# Patient Record
Sex: FEMALE | Race: OTHER | Employment: UNEMPLOYED | URBAN - METROPOLITAN AREA
[De-identification: names, ages, dates, MRNs, and addresses within clinical notes are randomized per-mention and may not be internally consistent; named-entity substitution may affect disease eponyms.]

---

## 2021-12-29 PROCEDURE — 88185 FLOWCYTOMETRY/TC ADD-ON: CPT | Performed by: RADIOLOGY

## 2021-12-29 PROCEDURE — 88313 SPECIAL STAINS GROUP 2: CPT | Performed by: RADIOLOGY

## 2021-12-29 PROCEDURE — 88342 IMHCHEM/IMCYTCHM 1ST ANTB: CPT | Performed by: RADIOLOGY

## 2021-12-29 PROCEDURE — 88305 TISSUE EXAM BY PATHOLOGIST: CPT | Performed by: RADIOLOGY

## 2021-12-29 PROCEDURE — 88341 IMHCHEM/IMCYTCHM EA ADD ANTB: CPT | Performed by: RADIOLOGY

## 2021-12-29 PROCEDURE — 85097 BONE MARROW INTERPRETATION: CPT | Performed by: RADIOLOGY

## 2021-12-29 PROCEDURE — 88184 FLOWCYTOMETRY/ TC 1 MARKER: CPT | Performed by: RADIOLOGY

## 2021-12-30 PROCEDURE — 88264 CHROMOSOME ANALYSIS 20-25: CPT | Performed by: RADIOLOGY

## 2021-12-30 PROCEDURE — 88291 CYTO/MOLECULAR REPORT: CPT | Performed by: RADIOLOGY

## 2021-12-30 PROCEDURE — 88237 TISSUE CULTURE BONE MARROW: CPT | Performed by: RADIOLOGY

## 2022-02-22 PROBLEM — C50.412 MALIGNANT NEOPLASM OF UPPER-OUTER QUADRANT OF LEFT BREAST IN FEMALE, ESTROGEN RECEPTOR POSITIVE (HCC): Status: ACTIVE | Noted: 2022-02-22

## 2022-02-22 PROBLEM — I26.93 SINGLE SUBSEGMENTAL PULMONARY EMBOLISM WITHOUT ACUTE COR PULMONALE (HCC): Status: ACTIVE | Noted: 2022-02-22

## 2022-02-22 PROBLEM — D64.9 ANEMIA, UNSPECIFIED TYPE: Status: ACTIVE | Noted: 2022-02-22

## 2022-02-22 PROBLEM — Z17.0 MALIGNANT NEOPLASM OF UPPER-OUTER QUADRANT OF LEFT BREAST IN FEMALE, ESTROGEN RECEPTOR POSITIVE (HCC): Status: ACTIVE | Noted: 2022-02-22

## 2022-02-22 PROBLEM — C79.51 SECONDARY MALIGNANT NEOPLASM OF BONE (HCC): Status: ACTIVE | Noted: 2019-10-24

## 2022-03-17 ENCOUNTER — OFFICE VISIT (OUTPATIENT)
Dept: RHEUMATOLOGY | Facility: CLINIC | Age: 52
End: 2022-03-17
Payer: MEDICAID

## 2022-03-17 VITALS
HEIGHT: 60 IN | DIASTOLIC BLOOD PRESSURE: 75 MMHG | HEART RATE: 83 BPM | OXYGEN SATURATION: 96 % | BODY MASS INDEX: 30.04 KG/M2 | SYSTOLIC BLOOD PRESSURE: 100 MMHG | WEIGHT: 153 LBS

## 2022-03-17 DIAGNOSIS — M17.11 PRIMARY OSTEOARTHRITIS OF RIGHT KNEE: ICD-10-CM

## 2022-03-17 DIAGNOSIS — R77.8 LOW SERUM COMPLEMENT C3: ICD-10-CM

## 2022-03-17 DIAGNOSIS — M17.12 PRIMARY OSTEOARTHRITIS OF LEFT KNEE: ICD-10-CM

## 2022-03-17 DIAGNOSIS — Z11.1 SCREENING FOR TUBERCULOSIS: ICD-10-CM

## 2022-03-17 DIAGNOSIS — C79.51 SECONDARY MALIGNANT NEOPLASM OF BONE (HCC): ICD-10-CM

## 2022-03-17 DIAGNOSIS — Z51.81 THERAPEUTIC DRUG MONITORING: ICD-10-CM

## 2022-03-17 DIAGNOSIS — I26.93 SINGLE SUBSEGMENTAL PULMONARY EMBOLISM WITHOUT ACUTE COR PULMONALE (HCC): ICD-10-CM

## 2022-03-17 DIAGNOSIS — M25.461 EFFUSION OF RIGHT KNEE: ICD-10-CM

## 2022-03-17 DIAGNOSIS — M25.462 EFFUSION OF LEFT KNEE: ICD-10-CM

## 2022-03-17 DIAGNOSIS — M06.09 RHEUMATOID ARTHRITIS OF MULTIPLE SITES WITH NEGATIVE RHEUMATOID FACTOR (HCC): Primary | ICD-10-CM

## 2022-03-17 DIAGNOSIS — Z11.59 NEED FOR HEPATITIS C SCREENING TEST: ICD-10-CM

## 2022-03-17 DIAGNOSIS — O03.9 MISCARRIAGE AT 8 TO 28 WEEKS GESTATION: ICD-10-CM

## 2022-03-17 DIAGNOSIS — Z11.59 NEED FOR HEPATITIS B SCREENING TEST: ICD-10-CM

## 2022-03-17 PROCEDURE — 20610 DRAIN/INJ JOINT/BURSA W/O US: CPT | Performed by: INTERNAL MEDICINE

## 2022-03-17 PROCEDURE — 3074F SYST BP LT 130 MM HG: CPT | Performed by: INTERNAL MEDICINE

## 2022-03-17 PROCEDURE — 3078F DIAST BP <80 MM HG: CPT | Performed by: INTERNAL MEDICINE

## 2022-03-17 PROCEDURE — 3008F BODY MASS INDEX DOCD: CPT | Performed by: INTERNAL MEDICINE

## 2022-03-17 PROCEDURE — 99205 OFFICE O/P NEW HI 60 MIN: CPT | Performed by: INTERNAL MEDICINE

## 2022-03-17 RX ORDER — PREDNISONE 1 MG/1
TABLET ORAL
Qty: 28 TABLET | Refills: 0 | Status: SHIPPED | OUTPATIENT
Start: 2022-03-17 | End: 2022-04-07

## 2022-03-17 RX ORDER — VALGANCICLOVIR 450 MG/1
450 TABLET, FILM COATED ORAL DAILY
COMMUNITY

## 2022-03-17 RX ORDER — HYDROXYCHLOROQUINE SULFATE 200 MG/1
TABLET, FILM COATED ORAL
Qty: 180 TABLET | Refills: 1 | Status: SHIPPED | OUTPATIENT
Start: 2022-03-17

## 2022-03-17 RX ORDER — TRIAMCINOLONE ACETONIDE 40 MG/ML
40 INJECTION, SUSPENSION INTRA-ARTICULAR; INTRAMUSCULAR ONCE
Status: COMPLETED | OUTPATIENT
Start: 2022-03-17 | End: 2022-03-17

## 2022-03-17 RX ADMIN — TRIAMCINOLONE ACETONIDE 40 MG: 40 INJECTION, SUSPENSION INTRA-ARTICULAR; INTRAMUSCULAR at 17:26:00

## 2022-03-17 RX ADMIN — TRIAMCINOLONE ACETONIDE 40 MG: 40 INJECTION, SUSPENSION INTRA-ARTICULAR; INTRAMUSCULAR at 17:27:00

## 2022-03-17 NOTE — PATIENT INSTRUCTIONS
Hydroxychloroquine (plaquenil) start: Take 1 tab daily for 2 weeks, and if tolerating, then increase to 1 tab twice daily. Start pred 10 mg x 1 week, then pred 5 mg daily x 2 weeks. Take in the morning. Post Injection Instructions    1. The injected area may feel more painful again after the anesthetic wears off approximately 2 to 3 hours after the injection. Also, the steroid may not begin to work for 3 to 5 days. 2.  Please avoid any strenuous activity or exercise using the area that was injected for a period of at least 3 to 5 days. 3.  Please apply ice to the area of injection for 10 to 15 minutes when you get home. If needed, ice can be used every 4 hours as needed for 1 to 2 days after the injection. 4.  Please monitor the injected site carefully for signs of an infection. Please report to the emergency room with development of redness, swelling, severe pain, fever, chills, and/or drainage of pus from the injection site. 5.  If you are diabetic, steroid injection will likely increase your blood sugar level. Please monitor your blood sugar more frequently to make appropriate adjustments in your medication and let your primary care physician and/or diabetes doctor know to help manage it.

## 2022-03-18 ENCOUNTER — LAB ENCOUNTER (OUTPATIENT)
Dept: LAB | Age: 52
End: 2022-03-18
Attending: INTERNAL MEDICINE
Payer: MEDICAID

## 2022-03-18 DIAGNOSIS — Z11.1 SCREENING FOR TUBERCULOSIS: ICD-10-CM

## 2022-03-18 DIAGNOSIS — O03.9 MISCARRIAGE AT 8 TO 28 WEEKS GESTATION: ICD-10-CM

## 2022-03-18 DIAGNOSIS — R77.8 LOW SERUM COMPLEMENT C3: Primary | ICD-10-CM

## 2022-03-18 DIAGNOSIS — I26.93 SINGLE SUBSEGMENTAL PULMONARY EMBOLISM WITHOUT ACUTE COR PULMONALE (HCC): ICD-10-CM

## 2022-03-18 NOTE — PROGRESS NOTES
Procedure Note: right knee injection. The risks, benefits, and alternatives of the procedure were explained, and verbal consent was obtained. Area over medial mid-patellar aspect of knee was prepped with chlorhexidine and numbed with ethyl choloride spray. 25-gauge needle was inserted, and 40 mg of kenalog and 2 cc of lidocaine was injected. Patient tolerated procedure well without any immediate complications. Procedure Note: left knee injection. The risks, benefits, and alternatives of the procedure were explained, and verbal consent was obtained. Area over medial mid-patellar aspect of knee was prepped with chlorhexidine and numbed with ethyl choloride spray. 25-gauge needle was inserted, and 40 mg of kenalog and 2 cc of lidocaine was injected. Patient tolerated procedure well without any immediate complications.

## 2022-03-19 ENCOUNTER — LAB ENCOUNTER (OUTPATIENT)
Dept: LAB | Age: 52
End: 2022-03-19
Attending: INTERNAL MEDICINE
Payer: MEDICAID

## 2022-03-19 DIAGNOSIS — C79.51 SECONDARY MALIGNANT NEOPLASM OF BONE (HCC): ICD-10-CM

## 2022-03-19 DIAGNOSIS — Z11.59 NEED FOR HEPATITIS B SCREENING TEST: ICD-10-CM

## 2022-03-19 DIAGNOSIS — R77.8 LOW SERUM COMPLEMENT C3: ICD-10-CM

## 2022-03-19 DIAGNOSIS — Z11.1 SCREENING FOR TUBERCULOSIS: ICD-10-CM

## 2022-03-19 DIAGNOSIS — M06.09 RHEUMATOID ARTHRITIS OF MULTIPLE SITES WITH NEGATIVE RHEUMATOID FACTOR (HCC): ICD-10-CM

## 2022-03-19 DIAGNOSIS — O03.9 MISCARRIAGE AT 8 TO 28 WEEKS GESTATION: ICD-10-CM

## 2022-03-19 DIAGNOSIS — R76.8 HEPATITIS B CORE ANTIBODY POSITIVE: ICD-10-CM

## 2022-03-19 DIAGNOSIS — Z51.81 THERAPEUTIC DRUG MONITORING: ICD-10-CM

## 2022-03-19 DIAGNOSIS — I26.93 SINGLE SUBSEGMENTAL PULMONARY EMBOLISM WITHOUT ACUTE COR PULMONALE (HCC): ICD-10-CM

## 2022-03-19 DIAGNOSIS — Z11.59 NEED FOR HEPATITIS C SCREENING TEST: ICD-10-CM

## 2022-03-22 ENCOUNTER — LAB ENCOUNTER (OUTPATIENT)
Dept: LAB | Facility: HOSPITAL | Age: 52
End: 2022-03-22
Attending: INTERNAL MEDICINE
Payer: MEDICAID

## 2022-03-22 ENCOUNTER — TELEPHONE (OUTPATIENT)
Dept: RHEUMATOLOGY | Facility: CLINIC | Age: 52
End: 2022-03-22

## 2022-03-22 ENCOUNTER — HOSPITAL ENCOUNTER (EMERGENCY)
Facility: HOSPITAL | Age: 52
Discharge: LEFT BEFORE DISCHARGE | End: 2022-03-22
Payer: MEDICAID

## 2022-03-22 ENCOUNTER — LAB ENCOUNTER (OUTPATIENT)
Dept: LAB | Age: 52
End: 2022-03-22
Attending: INTERNAL MEDICINE
Payer: MEDICAID

## 2022-03-22 VITALS
DIASTOLIC BLOOD PRESSURE: 81 MMHG | HEART RATE: 75 BPM | TEMPERATURE: 99 F | OXYGEN SATURATION: 94 % | SYSTOLIC BLOOD PRESSURE: 126 MMHG | RESPIRATION RATE: 19 BRPM

## 2022-03-22 DIAGNOSIS — C50.412 MALIGNANT NEOPLASM OF UPPER-OUTER QUADRANT OF LEFT BREAST IN FEMALE, ESTROGEN RECEPTOR POSITIVE (HCC): ICD-10-CM

## 2022-03-22 DIAGNOSIS — C79.51 SECONDARY MALIGNANT NEOPLASM OF BONE (HCC): ICD-10-CM

## 2022-03-22 DIAGNOSIS — D64.9 ANEMIA, UNSPECIFIED TYPE: ICD-10-CM

## 2022-03-22 DIAGNOSIS — I26.93 SINGLE SUBSEGMENTAL PULMONARY EMBOLISM WITHOUT ACUTE COR PULMONALE (HCC): ICD-10-CM

## 2022-03-22 DIAGNOSIS — Z17.0 MALIGNANT NEOPLASM OF UPPER-OUTER QUADRANT OF LEFT BREAST IN FEMALE, ESTROGEN RECEPTOR POSITIVE (HCC): ICD-10-CM

## 2022-03-22 LAB
ALBUMIN SERPL-MCNC: 3.3 G/DL (ref 3.4–5)
ALBUMIN/GLOB SERPL: 0.8 {RATIO} (ref 1–2)
ALP LIVER SERPL-CCNC: 93 U/L
ALT SERPL-CCNC: 31 U/L
ANION GAP SERPL CALC-SCNC: 3 MMOL/L (ref 0–18)
AST SERPL-CCNC: 31 U/L (ref 15–37)
BASOPHILS # BLD AUTO: 0.02 X10(3) UL (ref 0–0.2)
BILIRUB SERPL-MCNC: 0.8 MG/DL (ref 0.1–2)
BUN BLD-MCNC: 14 MG/DL (ref 7–18)
C3 SERPL-MCNC: 96.2 MG/DL (ref 90–180)
C4 SERPL-MCNC: 14.1 MG/DL (ref 10–40)
CALCIUM BLD-MCNC: 8.6 MG/DL (ref 8.5–10.1)
CHLORIDE SERPL-SCNC: 109 MMOL/L (ref 98–112)
CO2 SERPL-SCNC: 27 MMOL/L (ref 21–32)
CREAT BLD-MCNC: 0.56 MG/DL
CRP SERPL-MCNC: <0.29 MG/DL (ref ?–0.3)
EOSINOPHIL # BLD AUTO: 0.21 X10(3) UL (ref 0–0.7)
EOSINOPHIL NFR BLD AUTO: 1.9 %
ERYTHROCYTE [DISTWIDTH] IN BLOOD BY AUTOMATED COUNT: 14.7 %
ERYTHROCYTE [SEDIMENTATION RATE] IN BLOOD: 37 MM/HR
FASTING STATUS PATIENT QL REPORTED: NO
GLOBULIN PLAS-MCNC: 4.4 G/DL (ref 2.8–4.4)
GLUCOSE BLD-MCNC: 89 MG/DL (ref 70–99)
HBV CORE AB SERPL QL IA: REACTIVE
HBV SURFACE AB SER QL: REACTIVE
HBV SURFACE AB SERPL IA-ACNC: 107.54 MIU/ML
HBV SURFACE AG SER-ACNC: <0.1 [IU]/L
HBV SURFACE AG SERPL QL IA: NONREACTIVE
HCT VFR BLD AUTO: 39.1 %
HCV AB SERPL QL IA: NONREACTIVE
HGB BLD-MCNC: 13 G/DL
IGA SERPL-MCNC: 610 MG/DL (ref 70–312)
IGM SERPL-MCNC: 119 MG/DL (ref 43–279)
IMM GRANULOCYTES # BLD AUTO: 0.03 X10(3) UL (ref 0–1)
IMM GRANULOCYTES NFR BLD: 0.3 %
IMMUNOGLOBULIN PNL SER-MCNC: 1780 MG/DL (ref 791–1643)
INR BLD: 1.16 (ref 0.8–1.2)
LYMPHOCYTES # BLD AUTO: 3.54 X10(3) UL (ref 1–4)
LYMPHOCYTES NFR BLD AUTO: 32.5 %
MCH RBC QN AUTO: 30.5 PG (ref 26–34)
MCHC RBC AUTO-ENTMCNC: 33.2 G/DL (ref 31–37)
MCV RBC AUTO: 91.8 FL
MONOCYTES # BLD AUTO: 1.03 X10(3) UL (ref 0.1–1)
MONOCYTES NFR BLD AUTO: 9.5 %
NEUTROPHILS # BLD AUTO: 6.06 X10 (3) UL (ref 1.5–7.7)
NEUTROPHILS # BLD AUTO: 6.06 X10(3) UL (ref 1.5–7.7)
NEUTROPHILS NFR BLD AUTO: 55.6 %
OSMOLALITY SERPL CALC.SUM OF ELEC: 288 MOSM/KG (ref 275–295)
PLATELET # BLD AUTO: 165 10(3)UL (ref 150–450)
POTASSIUM SERPL-SCNC: 3.6 MMOL/L (ref 3.5–5.1)
PROT SERPL-MCNC: 7.7 G/DL (ref 6.4–8.2)
PROTHROMBIN TIME: 14.8 SECONDS (ref 11.6–14.8)
RBC # BLD AUTO: 4.26 X10(6)UL
RHEUMATOID FACT SERPL-ACNC: 144 IU/ML (ref ?–15)
SODIUM SERPL-SCNC: 139 MMOL/L (ref 136–145)
WBC # BLD AUTO: 10.9 X10(3) UL (ref 4–11)

## 2022-03-22 PROCEDURE — 82784 ASSAY IGA/IGD/IGG/IGM EACH: CPT

## 2022-03-22 PROCEDURE — 82585 ASSAY OF CRYOFIBRINOGEN: CPT

## 2022-03-22 PROCEDURE — 82595 ASSAY OF CRYOGLOBULIN: CPT

## 2022-03-22 PROCEDURE — 86480 TB TEST CELL IMMUN MEASURE: CPT

## 2022-03-22 PROCEDURE — 86803 HEPATITIS C AB TEST: CPT

## 2022-03-22 PROCEDURE — 85652 RBC SED RATE AUTOMATED: CPT

## 2022-03-22 PROCEDURE — 86038 ANTINUCLEAR ANTIBODIES: CPT

## 2022-03-22 PROCEDURE — 86147 CARDIOLIPIN ANTIBODY EA IG: CPT

## 2022-03-22 PROCEDURE — 86160 COMPLEMENT ANTIGEN: CPT

## 2022-03-22 PROCEDURE — 80053 COMPREHEN METABOLIC PANEL: CPT

## 2022-03-22 PROCEDURE — 86334 IMMUNOFIX E-PHORESIS SERUM: CPT

## 2022-03-22 PROCEDURE — 83521 IG LIGHT CHAINS FREE EACH: CPT

## 2022-03-22 PROCEDURE — 86431 RHEUMATOID FACTOR QUANT: CPT

## 2022-03-22 PROCEDURE — 87517 HEPATITIS B DNA QUANT: CPT

## 2022-03-22 PROCEDURE — 85610 PROTHROMBIN TIME: CPT

## 2022-03-22 PROCEDURE — 86140 C-REACTIVE PROTEIN: CPT

## 2022-03-22 PROCEDURE — 84165 PROTEIN E-PHORESIS SERUM: CPT

## 2022-03-22 PROCEDURE — 36415 COLL VENOUS BLD VENIPUNCTURE: CPT

## 2022-03-22 PROCEDURE — 86704 HEP B CORE ANTIBODY TOTAL: CPT

## 2022-03-22 PROCEDURE — 86146 BETA-2 GLYCOPROTEIN ANTIBODY: CPT

## 2022-03-22 PROCEDURE — 85613 RUSSELL VIPER VENOM DILUTED: CPT

## 2022-03-22 PROCEDURE — 86036 ANCA SCREEN EACH ANTIBODY: CPT

## 2022-03-22 PROCEDURE — 85705 THROMBOPLASTIN INHIBITION: CPT

## 2022-03-22 PROCEDURE — 85025 COMPLETE CBC W/AUTO DIFF WBC: CPT

## 2022-03-22 PROCEDURE — 86706 HEP B SURFACE ANTIBODY: CPT

## 2022-03-22 PROCEDURE — 86200 CCP ANTIBODY: CPT

## 2022-03-22 PROCEDURE — 87340 HEPATITIS B SURFACE AG IA: CPT

## 2022-03-22 PROCEDURE — 85732 THROMBOPLASTIN TIME PARTIAL: CPT

## 2022-03-22 PROCEDURE — 83516 IMMUNOASSAY NONANTIBODY: CPT

## 2022-03-22 NOTE — ED QUICK NOTES
Family approached triage desk, stating they only need blood tests. PT and family requesting to leave ER and go to outpatient lab.

## 2022-03-22 NOTE — ED INITIAL ASSESSMENT (HPI)
A&Ox3 patient p/w multiple complaints    Patient stating her PMD sent her in for blood work, unable to recall what labs are needed. Patient also reports \"I need medication to stop my bleeding because I am going to a holy place to pray for my breast cancer\" states her doctor has given it to her before, but MD not available today. Denies any complaints today, no pain/cp/sob/n/v/d/c/f/LH/urinary sx at this time.  RR even/NL, speaking in full clear sentences

## 2022-03-23 LAB — ANA SER QL: NEGATIVE

## 2022-03-24 LAB
M TB IFN-G CD4+ T-CELLS BLD-ACNC: 0.02 IU/ML
M TB TUBERC IFN-G BLD QL: NEGATIVE
M TB TUBERC IGNF/MITOGEN IGNF CONTROL: >10 IU/ML
QFT TB1 AG MINUS NIL: 0.03 IU/ML
QFT TB2 AG MINUS NIL: 0.02 IU/ML

## 2022-03-25 LAB
ALBUMIN SERPL ELPH-MCNC: 3.57 G/DL (ref 3.75–5.21)
ALBUMIN/GLOB SERPL: 0.89 {RATIO} (ref 1–2)
ALPHA1 GLOB SERPL ELPH-MCNC: 0.28 G/DL (ref 0.19–0.46)
ALPHA2 GLOB SERPL ELPH-MCNC: 0.79 G/DL (ref 0.48–1.05)
ANTI-NUCLEAR ANTIBODY (ANA), HEP-2 IGG: DETECTED
APTT PPP: 26.4 SECONDS (ref 23.3–35.6)
B-GLOBULIN SERPL ELPH-MCNC: 1.14 G/DL (ref 0.68–1.23)
B2 GLYCOPROT1 IGG SERPL IA-ACNC: 1.6 U/ML (ref ?–7)
B2 GLYCOPROT1 IGM SERPL IA-ACNC: <2.9 U/ML (ref ?–7)
CARDIOLIPIN IGG SERPL-ACNC: 4.8 GPL (ref ?–10)
CARDIOLIPIN IGM SERPL-ACNC: 1 MPL (ref ?–10)
CCP IGG SERPL-ACNC: 277 U/ML (ref 0–6.9)
GAMMA GLOB SERPL ELPH-MCNC: 1.82 G/DL (ref 0.62–1.7)
HBV QNT BY NAAT (LOG IU/ML): <1 LOG IU/ML
HBV QNT BY NAAT INTERP: DETECTED
KAPPA LC FREE SER-MCNC: 1.81 MG/DL (ref 0.33–1.94)
KAPPA LC FREE/LAMBDA FREE SER NEPH: 1.25 {RATIO} (ref 0.26–1.65)
LA 3 SCREEN W REFLEX-IMP: NEGATIVE
MYELOPEROX ANTIBODIES, IGG: 1 AU/ML
PROT SERPL-MCNC: 7.6 G/DL (ref 6.4–8.2)
PROTHROMBIN TIME: 14.9 SECONDS (ref 11.6–14.8)
SCREEN DRVVT: 0.9 S (ref 0–1.29)
SCREEN DRVVT: NEGATIVE S
SERINE PROTEASE 3, IGG: 0 AU/ML
STACLOT LA DELTA: 0 SECONDS (ref ?–8)

## 2022-03-29 LAB
CRYOGLOBULIN QUANTITATIVE SCREEN: POSITIVE
IGG, CRYOPRECIPITATE: 3 MG/DL
IGM, CRYOPRECIPITATE: 2 MG/DL

## 2022-04-22 ENCOUNTER — TELEPHONE (OUTPATIENT)
Dept: RHEUMATOLOGY | Facility: CLINIC | Age: 52
End: 2022-04-22

## 2022-04-22 NOTE — TELEPHONE ENCOUNTER
Pt no showed to her appointment yesterday, can she come April 28th at 11:30 am? Gail Pickett to [facility] in that slot

## 2022-05-06 ENCOUNTER — TELEMEDICINE (OUTPATIENT)
Dept: RHEUMATOLOGY | Facility: CLINIC | Age: 52
End: 2022-05-06
Payer: MEDICAID

## 2022-05-06 DIAGNOSIS — R76.8 HEPATITIS B CORE ANTIBODY POSITIVE: ICD-10-CM

## 2022-05-06 DIAGNOSIS — M05.79 RHEUMATOID ARTHRITIS INVOLVING MULTIPLE SITES WITH POSITIVE RHEUMATOID FACTOR (HCC): Primary | ICD-10-CM

## 2022-05-06 DIAGNOSIS — C79.51 SECONDARY MALIGNANT NEOPLASM OF BONE (HCC): ICD-10-CM

## 2022-05-06 PROCEDURE — 99214 OFFICE O/P EST MOD 30 MIN: CPT | Performed by: INTERNAL MEDICINE

## 2022-05-06 RX ORDER — HYDROXYCHLOROQUINE SULFATE 200 MG/1
200 TABLET, FILM COATED ORAL 2 TIMES DAILY
Qty: 180 TABLET | Refills: 1 | Status: SHIPPED | OUTPATIENT
Start: 2022-05-06

## 2022-05-06 RX ORDER — PREDNISONE 1 MG/1
5 TABLET ORAL DAILY
Qty: 30 TABLET | Refills: 0 | Status: SHIPPED | OUTPATIENT
Start: 2022-05-06 | End: 2022-05-06

## 2022-05-06 RX ORDER — PREDNISONE 1 MG/1
5 TABLET ORAL DAILY
Qty: 30 TABLET | Refills: 0 | Status: SHIPPED | OUTPATIENT
Start: 2022-05-06

## 2022-05-06 RX ORDER — HYDROXYCHLOROQUINE SULFATE 200 MG/1
200 TABLET, FILM COATED ORAL 2 TIMES DAILY
Qty: 180 TABLET | Refills: 1 | Status: SHIPPED | OUTPATIENT
Start: 2022-05-06 | End: 2022-05-06

## 2022-06-02 ENCOUNTER — TELEPHONE (OUTPATIENT)
Dept: RHEUMATOLOGY | Facility: CLINIC | Age: 52
End: 2022-06-02

## 2022-06-03 NOTE — TELEPHONE ENCOUNTER
Called Dr. Maryan Ling office, they will send a page out to Dr. Bela Villarreal to contact Dr. Marcelle Jones to discuss pt.

## 2022-06-03 NOTE — TELEPHONE ENCOUNTER
Please call patient, can this patient come in next Wednesday at 8:30 or at 11 am next Thursday? Talked to Dr. Olivia Lowery, patient's oncologist.  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 Dr. Olivia Lowery is okay with either methotrexate or leflunomide.

## 2022-06-09 ENCOUNTER — OFFICE VISIT (OUTPATIENT)
Dept: RHEUMATOLOGY | Facility: CLINIC | Age: 52
End: 2022-06-09
Payer: MEDICAID

## 2022-06-09 VITALS
TEMPERATURE: 98 F | HEIGHT: 60 IN | DIASTOLIC BLOOD PRESSURE: 60 MMHG | HEART RATE: 92 BPM | SYSTOLIC BLOOD PRESSURE: 105 MMHG | OXYGEN SATURATION: 93 % | BODY MASS INDEX: 28.47 KG/M2 | WEIGHT: 145 LBS

## 2022-06-09 DIAGNOSIS — M05.79 RHEUMATOID ARTHRITIS INVOLVING MULTIPLE SITES WITH POSITIVE RHEUMATOID FACTOR (HCC): Primary | ICD-10-CM

## 2022-06-09 DIAGNOSIS — M17.12 PRIMARY OSTEOARTHRITIS OF LEFT KNEE: ICD-10-CM

## 2022-06-09 DIAGNOSIS — M25.462 EFFUSION OF LEFT KNEE: ICD-10-CM

## 2022-06-09 DIAGNOSIS — M17.0 BILATERAL PRIMARY OSTEOARTHRITIS OF KNEE: ICD-10-CM

## 2022-06-09 DIAGNOSIS — Z51.81 THERAPEUTIC DRUG MONITORING: ICD-10-CM

## 2022-06-09 PROBLEM — D70.9 NEUTROPENIA, UNSPECIFIED (HCC): Status: ACTIVE | Noted: 2022-06-09

## 2022-06-09 PROCEDURE — 3074F SYST BP LT 130 MM HG: CPT | Performed by: INTERNAL MEDICINE

## 2022-06-09 PROCEDURE — 3078F DIAST BP <80 MM HG: CPT | Performed by: INTERNAL MEDICINE

## 2022-06-09 PROCEDURE — 20610 DRAIN/INJ JOINT/BURSA W/O US: CPT | Performed by: INTERNAL MEDICINE

## 2022-06-09 PROCEDURE — 99214 OFFICE O/P EST MOD 30 MIN: CPT | Performed by: INTERNAL MEDICINE

## 2022-06-09 PROCEDURE — 3008F BODY MASS INDEX DOCD: CPT | Performed by: INTERNAL MEDICINE

## 2022-06-09 RX ORDER — TRIAMCINOLONE ACETONIDE 40 MG/ML
40 INJECTION, SUSPENSION INTRA-ARTICULAR; INTRAMUSCULAR ONCE
Status: COMPLETED | OUTPATIENT
Start: 2022-06-09 | End: 2022-06-09

## 2022-06-09 RX ORDER — FLUTICASONE PROPIONATE AND SALMETEROL XINAFOATE 230; 21 UG/1; UG/1
AEROSOL, METERED RESPIRATORY (INHALATION)
COMMUNITY
Start: 2022-06-08

## 2022-06-09 RX ORDER — LEG BRACE
EACH MISCELLANEOUS
Qty: 2 EACH | Refills: 0 | Status: SHIPPED | OUTPATIENT
Start: 2022-06-09

## 2022-06-09 RX ORDER — MORPHINE SULFATE 30 MG/1
TABLET, FILM COATED, EXTENDED RELEASE ORAL
COMMUNITY
Start: 2022-05-20

## 2022-06-09 RX ORDER — FOLIC ACID 1 MG/1
1 TABLET ORAL DAILY
Qty: 90 TABLET | Refills: 3 | Status: SHIPPED | OUTPATIENT
Start: 2022-06-09

## 2022-06-09 RX ORDER — LEG BRACE
EACH MISCELLANEOUS
Qty: 2 EACH | Refills: 0 | Status: SHIPPED | OUTPATIENT
Start: 2022-06-09 | End: 2022-06-09

## 2022-06-09 RX ORDER — PREDNISONE 1 MG/1
5 TABLET ORAL DAILY
Qty: 90 TABLET | Refills: 0 | Status: SHIPPED | OUTPATIENT
Start: 2022-06-09

## 2022-06-09 RX ORDER — METHOTREXATE 2.5 MG/1
TABLET ORAL
Qty: 50 TABLET | Refills: 0 | Status: SHIPPED | OUTPATIENT
Start: 2022-06-09 | End: 2022-08-12

## 2022-06-09 RX ADMIN — TRIAMCINOLONE ACETONIDE 40 MG: 40 INJECTION, SUSPENSION INTRA-ARTICULAR; INTRAMUSCULAR at 10:52:00

## 2022-06-09 NOTE — PROGRESS NOTES
Procedure Note: left knee injection. The risks, benefits, and alternatives of the procedure were explained, and verbal consent was obtained. Area over medial mid-patellar aspect of knee was prepped with chlorhexidine and numbed with ethyl choloride spray. 25-gauge needle was inserted, and 40 mg of kenalog and 2 cc of lidocaine was injected. Patient tolerated procedure well without any immediate complications.

## 2022-06-09 NOTE — PATIENT INSTRUCTIONS
Start hydroxychloroquine (plaquenil) 200 mg twice daily    Start methotrexate 10 mg (4 tablets) weekly for 2 weeks -> then increase to 15 mg (6 tablets) weekly for 2 weeks then get repeat bloodwork (this has been ordered already as \"standing labs\")    Take folic acid daily    ==============================================================================================================        Some tips and information when starting on methotrexate:     -Make sure to only take the pills once a week. Do not take the pills every day, which can increase risk of side effects.   -When you first start taking methotrexate, try taking it on a day you are not too busy, in the event you develop any side effects.   -Some find taking methotrexate at night helps them \"sleep off\" any nausea that sometimes can occur.   -If you find you have fatigue the day or two after taking methotrexate, try drinking an extra cup (or two) of coffee on those days. Methotrexate can briefly act as an \"anti-caffeine\"; this effect is usually only on the day that you take methotrexate.   -Make sure to take folic acid every day to prevent side effects. -If you have nausea with methotrexate, you can split up the weekly dose as long it is within a 24-30 hour window. So if you were taking 4 pills of methotrexate every Saturday evening, you could split the dose to 2 pills Saturday morning and 2 pills Saturday. Alternatively, you could also take 2 pills Saturday evening and 2 pills Sunday evening.     -Skip 2 weeks of methotrexate after the flu vaccine and 1 week of methotrexate after the COVID or pneumonia vaccine; this may make the vaccines work more effectively. -If you take an antibiotic for an infection, make sure to stop methotrexate until you are off the antibiotic.  -If you have an upcoming surgery, let me know. For most orthopedic surgeries such as knee/hip replacements, methotrexate may be continued.  For other surgeries, we will decide on a case-by-case basis.   -Don't drink more than 2-3 alcoholic beverages per week while on methotrexate.   -If planning on becoming pregnant, make sure to let me know as it can cause birth defects. Methotrexate needs to be stopped 3 months prior to attempting conception. Methotrexate does NOT impact future fertility.   -Low dose methotrexate is the most studied and commonly used rheumatoid arthritis medication. It is very safe if used appropriately. Most studies have not shown a significant increased risk of infection with the medication at low doses for arthritis. It is safe to take methotrexate during the COVID pandemic. It has NOT been shown that taking methotrexate increases risk of itz COVID or having a higher risk of severe complications from [place]. Therefore, from a practical standpoint, low dose methotrexate would not be considered an immunosuppressive as it does not increase risk  of infection for most people taking it. However, there may be a mild increase in infections if you have many other medical issues in combination such as heart disease, diabetes, etc.      Here is a nice article talking about common misconceptions and \"myths\" about methotrexate that is worth a read.    https://creakyjoints.org/treatment/methotrexate-myths/

## 2022-06-29 ENCOUNTER — OFFICE VISIT (OUTPATIENT)
Dept: RHEUMATOLOGY | Facility: CLINIC | Age: 52
End: 2022-06-29
Payer: MEDICAID

## 2022-06-29 VITALS
SYSTOLIC BLOOD PRESSURE: 110 MMHG | BODY MASS INDEX: 28.47 KG/M2 | WEIGHT: 145 LBS | OXYGEN SATURATION: 98 % | TEMPERATURE: 98 F | HEIGHT: 60 IN | DIASTOLIC BLOOD PRESSURE: 70 MMHG | HEART RATE: 112 BPM

## 2022-06-29 DIAGNOSIS — M20.001 ACQUIRED BILATERAL FINGER DEFORMITY: ICD-10-CM

## 2022-06-29 DIAGNOSIS — M20.002 ACQUIRED BILATERAL FINGER DEFORMITY: ICD-10-CM

## 2022-06-29 DIAGNOSIS — M05.79 RHEUMATOID ARTHRITIS INVOLVING MULTIPLE SITES WITH POSITIVE RHEUMATOID FACTOR (HCC): Primary | ICD-10-CM

## 2022-06-29 DIAGNOSIS — M25.421 EFFUSION OF RIGHT ELBOW: ICD-10-CM

## 2022-06-29 DIAGNOSIS — M19.049 OSTEOARTHRITIS OF HAND, UNSPECIFIED LATERALITY, UNSPECIFIED OSTEOARTHRITIS TYPE: ICD-10-CM

## 2022-06-29 DIAGNOSIS — Z51.81 THERAPEUTIC DRUG MONITORING: ICD-10-CM

## 2022-06-29 PROCEDURE — 99214 OFFICE O/P EST MOD 30 MIN: CPT | Performed by: INTERNAL MEDICINE

## 2022-06-29 PROCEDURE — 3008F BODY MASS INDEX DOCD: CPT | Performed by: INTERNAL MEDICINE

## 2022-06-29 PROCEDURE — 3074F SYST BP LT 130 MM HG: CPT | Performed by: INTERNAL MEDICINE

## 2022-06-29 PROCEDURE — 3078F DIAST BP <80 MM HG: CPT | Performed by: INTERNAL MEDICINE

## 2022-06-29 PROCEDURE — 20605 DRAIN/INJ JOINT/BURSA W/O US: CPT | Performed by: INTERNAL MEDICINE

## 2022-06-29 RX ORDER — METHOTREXATE 2.5 MG/1
15 TABLET ORAL WEEKLY
Qty: 78 TABLET | Refills: 0 | Status: SHIPPED | OUTPATIENT
Start: 2022-06-29 | End: 2022-09-28

## 2022-06-29 RX ORDER — PREDNISONE 1 MG/1
5 TABLET ORAL DAILY
Qty: 90 TABLET | Refills: 0 | Status: SHIPPED | OUTPATIENT
Start: 2022-06-29

## 2022-06-29 RX ORDER — TRIAMCINOLONE ACETONIDE 40 MG/ML
20 INJECTION, SUSPENSION INTRA-ARTICULAR; INTRAMUSCULAR ONCE
Status: COMPLETED | OUTPATIENT
Start: 2022-06-29 | End: 2022-06-29

## 2022-06-29 RX ORDER — POTASSIUM CHLORIDE 750 MG/1
TABLET, FILM COATED, EXTENDED RELEASE ORAL
COMMUNITY
Start: 2022-05-17

## 2022-06-29 RX ADMIN — TRIAMCINOLONE ACETONIDE 20 MG: 40 INJECTION, SUSPENSION INTRA-ARTICULAR; INTRAMUSCULAR at 13:14:00

## 2022-06-29 NOTE — PATIENT INSTRUCTIONS
Schedule hand therapy with occupational therapy. Walking or Running shoe: Ask  about a stability shoe that prevents pronation. One example is [employer] ([employer] store in [place]). For foot/ankle pain: get [employer]otics (from [employer] or sporting good stores). Get the green or blue versions     Continue methotrexate 6 tablets weekly, hydroxychloroquine (plaquenil) 200 mg twice daily. Repeat blood work (CMP and CBC) in 4 weeks.

## 2022-06-29 NOTE — PROGRESS NOTES
Procedure Note: right elbow injection. The risks, benefits, and alternatives of the procedure were explained, and verbal consent was obtained. Area over lateral aspect of elbow between olecranon, radial head, and lateral epicondyle was prepped with chlorhexidine and numbed with ethyl choloride spray. 22-gauge needle was inserted, and 20 mg of kenalog and 0.5 cc of lidocaine was injected. Patient tolerated procedure well without any immediate complications.

## 2022-07-07 ENCOUNTER — TELEPHONE (OUTPATIENT)
Dept: PHYSICAL THERAPY | Facility: HOSPITAL | Age: 52
End: 2022-07-07

## 2022-09-22 ENCOUNTER — TELEMEDICINE (OUTPATIENT)
Dept: RHEUMATOLOGY | Facility: CLINIC | Age: 52
End: 2022-09-22

## 2022-09-22 DIAGNOSIS — M05.79 RHEUMATOID ARTHRITIS INVOLVING MULTIPLE SITES WITH POSITIVE RHEUMATOID FACTOR (HCC): ICD-10-CM

## 2022-09-22 DIAGNOSIS — Z51.81 THERAPEUTIC DRUG MONITORING: ICD-10-CM

## 2022-09-22 DIAGNOSIS — Z79.52 LONG TERM CURRENT USE OF SYSTEMIC STEROIDS: Primary | ICD-10-CM

## 2022-09-22 PROCEDURE — 99214 OFFICE O/P EST MOD 30 MIN: CPT | Performed by: INTERNAL MEDICINE

## 2022-09-22 RX ORDER — ONDANSETRON HYDROCHLORIDE 8 MG/1
TABLET, FILM COATED ORAL
COMMUNITY
Start: 2022-09-01

## 2022-09-22 RX ORDER — METHOTREXATE 2.5 MG/1
20 TABLET ORAL WEEKLY
Qty: 104 TABLET | Refills: 0 | Status: SHIPPED | OUTPATIENT
Start: 2022-09-22 | End: 2022-12-22

## 2022-09-22 RX ORDER — PREDNISONE 1 MG/1
5 TABLET ORAL DAILY
Qty: 90 TABLET | Refills: 0 | Status: SHIPPED | OUTPATIENT
Start: 2022-09-22

## 2022-09-22 RX ORDER — FOLIC ACID 1 MG/1
1 TABLET ORAL DAILY
Qty: 90 TABLET | Refills: 3 | Status: SHIPPED | OUTPATIENT
Start: 2022-09-22

## 2022-09-22 NOTE — PATIENT INSTRUCTIONS
-continue hydroxychloroquine (plaquenil) 200 mg twice daily  -Increase methotrexate 15 mg (6 tablets) weekly to 20 mg (8 tablets weekly); methotrexate with very low risk of HBV reactivation  -prednisone 5 mg daily  -taking folic acid daily

## 2022-09-29 ENCOUNTER — OFFICE VISIT (OUTPATIENT)
Dept: RHEUMATOLOGY | Facility: CLINIC | Age: 52
End: 2022-09-29

## 2022-09-29 VITALS
HEART RATE: 99 BPM | DIASTOLIC BLOOD PRESSURE: 70 MMHG | TEMPERATURE: 98 F | SYSTOLIC BLOOD PRESSURE: 115 MMHG | BODY MASS INDEX: 29.45 KG/M2 | WEIGHT: 150 LBS | RESPIRATION RATE: 16 BRPM | OXYGEN SATURATION: 97 % | HEIGHT: 60 IN

## 2022-09-29 DIAGNOSIS — M20.002 ACQUIRED BILATERAL FINGER DEFORMITY: ICD-10-CM

## 2022-09-29 DIAGNOSIS — M17.0 BILATERAL PRIMARY OSTEOARTHRITIS OF KNEE: ICD-10-CM

## 2022-09-29 DIAGNOSIS — M19.042 PRIMARY OSTEOARTHRITIS OF BOTH HANDS: ICD-10-CM

## 2022-09-29 DIAGNOSIS — M17.11 ARTHRITIS OF RIGHT KNEE: ICD-10-CM

## 2022-09-29 DIAGNOSIS — M05.79 RHEUMATOID ARTHRITIS INVOLVING MULTIPLE SITES WITH POSITIVE RHEUMATOID FACTOR (HCC): Primary | ICD-10-CM

## 2022-09-29 DIAGNOSIS — M19.041 PRIMARY OSTEOARTHRITIS OF BOTH HANDS: ICD-10-CM

## 2022-09-29 DIAGNOSIS — M20.001 ACQUIRED BILATERAL FINGER DEFORMITY: ICD-10-CM

## 2022-09-29 DIAGNOSIS — M17.12 PRIMARY OSTEOARTHRITIS OF LEFT KNEE: ICD-10-CM

## 2022-09-29 PROCEDURE — 99213 OFFICE O/P EST LOW 20 MIN: CPT | Performed by: INTERNAL MEDICINE

## 2022-09-29 PROCEDURE — 3078F DIAST BP <80 MM HG: CPT | Performed by: INTERNAL MEDICINE

## 2022-09-29 PROCEDURE — 3074F SYST BP LT 130 MM HG: CPT | Performed by: INTERNAL MEDICINE

## 2022-09-29 PROCEDURE — 20610 DRAIN/INJ JOINT/BURSA W/O US: CPT | Performed by: INTERNAL MEDICINE

## 2022-09-29 PROCEDURE — 3008F BODY MASS INDEX DOCD: CPT | Performed by: INTERNAL MEDICINE

## 2022-09-29 RX ORDER — CAPECITABINE 500 MG/1
TABLET, FILM COATED ORAL
COMMUNITY
Start: 2022-09-14

## 2022-09-29 RX ORDER — TRIAMCINOLONE ACETONIDE 40 MG/ML
40 INJECTION, SUSPENSION INTRA-ARTICULAR; INTRAMUSCULAR ONCE
Status: COMPLETED | OUTPATIENT
Start: 2022-09-29 | End: 2022-09-29

## 2022-09-29 RX ADMIN — TRIAMCINOLONE ACETONIDE 40 MG: 40 INJECTION, SUSPENSION INTRA-ARTICULAR; INTRAMUSCULAR at 17:16:00

## 2022-09-29 RX ADMIN — TRIAMCINOLONE ACETONIDE 40 MG: 40 INJECTION, SUSPENSION INTRA-ARTICULAR; INTRAMUSCULAR at 17:18:00

## 2022-09-29 NOTE — PATIENT INSTRUCTIONS
-Get covid vaccine and flu shot  -continue hydroxychloroquine (plaquenil) 200 mg twice daily  -Increase methotrexate 15 mg (6 tablets) weekly to 20 mg (8 tablets weekly); methotrexate with very low risk of HBV reactivation  -prednisone 5 mg daily  -taking folic acid daily   -schedule appointment for Occupational Therapy for splinting for boutonnieres deformity, RA, hand

## 2022-09-30 NOTE — PROGRESS NOTES
Procedure Note: right knee injection. The risks, benefits, and alternatives of the procedure were explained, and verbal consent was obtained. Area over medial midpatellar aspect of knee was prepped with chlorhexidine x 3 and then 70% isopropyl alcohol stick swab x 3. Area was numbed with ethyl choloride spray. 25-gauge needle was inserted, and 40 mg of kenalog and 2 cc of lidocaine was injected. Patient tolerated procedure well without any immediate complications. Procedure Note: left knee injection. The risks, benefits, and alternatives of the procedure were explained, and verbal consent was obtained. Area over medial midpatellar aspect of knee was prepped with chlorhexidine x 3 and then 70% isopropyl alcohol stick swab x 3. Area was numbed with ethyl choloride spray. 25-gauge needle was inserted, and 40 mg of kenalog and 2 cc of lidocaine was injected. Patient tolerated procedure well without any immediate complications.

## 2022-10-07 ENCOUNTER — TELEPHONE (OUTPATIENT)
Dept: PHYSICAL THERAPY | Facility: HOSPITAL | Age: 52
End: 2022-10-07

## 2022-12-30 ENCOUNTER — TELEPHONE (OUTPATIENT)
Dept: RHEUMATOLOGY | Facility: CLINIC | Age: 52
End: 2022-12-30

## 2023-01-03 ENCOUNTER — TELEPHONE (OUTPATIENT)
Dept: RHEUMATOLOGY | Facility: CLINIC | Age: 53
End: 2023-01-03

## 2023-01-03 NOTE — TELEPHONE ENCOUNTER
Phoned onc office, was on hold for over 30min. Dialed 1 for call back to our number. Copy of labs received and faxed with confirmation.

## 2023-01-03 NOTE — TELEPHONE ENCOUNTER
[facility] phoned office with critical test result. Platelets --33. Will fax copy of results to out office.

## (undated) DIAGNOSIS — R76.8 HEPATITIS B CORE ANTIBODY POSITIVE: Primary | ICD-10-CM